# Patient Record
Sex: FEMALE | URBAN - METROPOLITAN AREA
[De-identification: names, ages, dates, MRNs, and addresses within clinical notes are randomized per-mention and may not be internally consistent; named-entity substitution may affect disease eponyms.]

---

## 2023-12-28 ENCOUNTER — OUTPATIENT (OUTPATIENT)
Dept: OUTPATIENT SERVICES | Facility: HOSPITAL | Age: 88
LOS: 1 days | End: 2023-12-28
Payer: MEDICARE

## 2023-12-28 ENCOUNTER — LABORATORY RESULT (OUTPATIENT)
Age: 88
End: 2023-12-28

## 2023-12-28 ENCOUNTER — TRANSCRIPTION ENCOUNTER (OUTPATIENT)
Age: 88
End: 2023-12-28

## 2023-12-28 ENCOUNTER — APPOINTMENT (OUTPATIENT)
Dept: WOUND CARE | Facility: CLINIC | Age: 88
End: 2023-12-28
Payer: MEDICARE

## 2023-12-28 VITALS
RESPIRATION RATE: 16 BRPM | TEMPERATURE: 98.3 F | HEART RATE: 88 BPM | DIASTOLIC BLOOD PRESSURE: 69 MMHG | SYSTOLIC BLOOD PRESSURE: 160 MMHG | OXYGEN SATURATION: 96 %

## 2023-12-28 DIAGNOSIS — Z87.828 PERSONAL HISTORY OF OTHER (HEALED) PHYSICAL INJURY AND TRAUMA: ICD-10-CM

## 2023-12-28 DIAGNOSIS — Z78.9 OTHER SPECIFIED HEALTH STATUS: ICD-10-CM

## 2023-12-28 DIAGNOSIS — L03.119 CELLULITIS OF UNSPECIFIED PART OF LIMB: ICD-10-CM

## 2023-12-28 DIAGNOSIS — L97.922 NON-PRESSURE CHRONIC ULCER OF UNSPECIFIED PART OF LEFT LOWER LEG WITH FAT LAYER EXPOSED: ICD-10-CM

## 2023-12-28 PROBLEM — Z00.00 ENCOUNTER FOR PREVENTIVE HEALTH EXAMINATION: Status: ACTIVE | Noted: 2023-12-28

## 2023-12-28 PROCEDURE — 93922 UPR/L XTREMITY ART 2 LEVELS: CPT | Mod: 26

## 2023-12-28 PROCEDURE — 11042 DBRDMT SUBQ TIS 1ST 20SQCM/<: CPT

## 2023-12-28 PROCEDURE — G0463: CPT | Mod: 25

## 2023-12-28 PROCEDURE — 99204 OFFICE O/P NEW MOD 45 MIN: CPT | Mod: 25

## 2023-12-28 RX ORDER — QUETIAPINE 50 MG/1
50 TABLET, FILM COATED ORAL
Refills: 0 | Status: ACTIVE | COMMUNITY

## 2023-12-28 RX ORDER — MIRTAZAPINE 15 MG/1
15 TABLET, FILM COATED ORAL
Refills: 0 | Status: ACTIVE | COMMUNITY

## 2023-12-28 RX ORDER — GABAPENTIN 100 MG/1
100 CAPSULE ORAL
Refills: 0 | Status: ACTIVE | COMMUNITY

## 2023-12-30 PROBLEM — Z87.828 HISTORY OF TRAUMA: Status: RESOLVED | Noted: 2023-12-30 | Resolved: 2023-12-30

## 2023-12-30 PROBLEM — Z78.9 CURRENT NON-SMOKER: Status: ACTIVE | Noted: 2023-12-30

## 2023-12-30 PROBLEM — Z78.9 ALCOHOL USE: Status: ACTIVE | Noted: 2023-12-30

## 2023-12-30 PROBLEM — L03.119 CELLULITIS OF LOWER EXTREMITY, UNSPECIFIED LATERALITY: Status: ACTIVE | Noted: 2023-12-30

## 2023-12-30 NOTE — HISTORY OF PRESENT ILLNESS
[FreeTextEntry1] : location right leg wounds severity with no pain, no fever timing/duration 3 weeks+ trauma? quality  not on ac other here with aide, Italian, speaks english, DAUGHTER IS PROXY

## 2023-12-30 NOTE — ASSESSMENT
[FreeTextEntry1] : 89 yr old right leg hematoma and s/p right leg cellulitis, signs of PAD on contra leg, will need venous and arterial workup/  culture taken from proximal wound right leg  Patient underwent evaluation for peripheral arterial disease using a [employer] MD diagnostic machine.  Ankle brachial index was obtained using blood pressure cuffs of the ankle and brachial segments of both legs.  A distal pulse volume recording was noted digitally on the device.  The procedure was supervised and interpreted by the physician.  MYRON of the right lower extremity /   MYRON of the left lower extremity ----.  Waveform noted to be ------- (monophasic left, biphasic right).   Patient tolerated procedure well in the office.  See full report Results discussed with the patient.    datacomplexity moderate lab, xr, old rec, test resultsreview,, visualize imagecptreview previous  risk-high frailty surgery  suppplies ordered medihoney patches/ kerlex loose ace no labs today nursing/woundcare orders  : done will reeval, ask daughter if she wants vna consult

## 2023-12-30 NOTE — PHYSICAL EXAM
[JVD] : no jugular venous distention  [Normal Breath Sounds] : Normal breath sounds [Normal Rate and Rhythm] : normal rate and rhythm [Femoral Arteries] : Normal femoral pulses [2+] : left 2+ [1+] : right 1+ [0] : left 0 [Ankle Swelling (On Exam)] : present [Abdomen Tenderness] : ~T ~M No abdominal tenderness [Skin Ulcer] : ulcer [Alert] : alert [Oriented to Person] : oriented to person [Oriented to Place] : oriented to place [Oriented to Time] : oriented to time [Calm] : calm [de-identified] : nad [de-identified] : speaks clearly eomi [de-identified] : able to walk with aide and walker [Please See PDF for [employer]] : Please See PDF for [employer].

## 2024-01-03 DIAGNOSIS — F03.90 UNSPECIFIED DEMENTIA WITHOUT BEHAVIORAL DISTURBANCE: ICD-10-CM

## 2024-01-03 DIAGNOSIS — Z87.828 PERSONAL HISTORY OF OTHER (HEALED) PHYSICAL INJURY AND TRAUMA: ICD-10-CM

## 2024-01-03 DIAGNOSIS — S81.801A UNSPECIFIED OPEN WOUND, RIGHT LOWER LEG, INITIAL ENCOUNTER: ICD-10-CM

## 2024-01-03 DIAGNOSIS — Z78.9 OTHER SPECIFIED HEALTH STATUS: ICD-10-CM

## 2024-01-03 DIAGNOSIS — L03.119 CELLULITIS OF UNSPECIFIED PART OF LIMB: ICD-10-CM

## 2024-01-03 DIAGNOSIS — S80.11XA CONTUSION OF RIGHT LOWER LEG, INITIAL ENCOUNTER: ICD-10-CM

## 2024-01-05 ENCOUNTER — APPOINTMENT (OUTPATIENT)
Dept: WOUND CARE | Facility: CLINIC | Age: 89
End: 2024-01-05
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
## 2024-01-25 ENCOUNTER — APPOINTMENT (OUTPATIENT)
Dept: WOUND CARE | Facility: HOSPITAL | Age: 89
End: 2024-01-25

## 2024-02-09 ENCOUNTER — APPOINTMENT (OUTPATIENT)
Dept: WOUND CARE | Facility: HOSPITAL | Age: 89
End: 2024-02-09
Payer: MEDICARE

## 2024-02-09 VITALS
WEIGHT: 127 LBS | TEMPERATURE: 99.7 F | SYSTOLIC BLOOD PRESSURE: 151 MMHG | HEART RATE: 72 BPM | OXYGEN SATURATION: 96 % | DIASTOLIC BLOOD PRESSURE: 74 MMHG

## 2024-02-09 VITALS — TEMPERATURE: 100.4 F

## 2024-02-09 DIAGNOSIS — I73.9 PERIPHERAL VASCULAR DISEASE, UNSPECIFIED: ICD-10-CM

## 2024-02-09 DIAGNOSIS — F03.90 UNSPECIFIED DEMENTIA W/OUT BEHAVIORAL DISTURBANCE: ICD-10-CM

## 2024-02-09 DIAGNOSIS — L97.929 NON-PRESSURE CHRONIC ULCER OF UNSPECIFIED PART OF LEFT LOWER LEG WITH UNSPECIFIED SEVERITY: ICD-10-CM

## 2024-02-09 DIAGNOSIS — S81.801A UNSPECIFIED OPEN WOUND, RIGHT LOWER LEG, INITIAL ENCOUNTER: ICD-10-CM

## 2024-02-09 DIAGNOSIS — L97.921 NON-PRESSURE CHRONIC ULCER OF UNSPECIFIED PART OF LEFT LOWER LEG LIMITED TO BREAKDOWN OF SKIN: ICD-10-CM

## 2024-02-09 DIAGNOSIS — S80.11XA CONTUSION OF RIGHT LOWER LEG, INITIAL ENCOUNTER: ICD-10-CM

## 2024-02-09 PROCEDURE — 99214 OFFICE O/P EST MOD 30 MIN: CPT

## 2024-02-20 PROBLEM — S81.801A OPEN WOUND OF RIGHT LOWER LEG: Status: ACTIVE | Noted: 2023-12-28

## 2024-02-20 PROBLEM — S80.11XA HEMATOMA OF LEG, RIGHT, INITIAL ENCOUNTER: Status: ACTIVE | Noted: 2023-12-30

## 2024-02-20 PROBLEM — I73.9 PAD (PERIPHERAL ARTERY DISEASE): Status: ACTIVE | Noted: 2024-02-20

## 2024-02-20 NOTE — PHYSICAL EXAM
[Normal Breath Sounds] : Normal breath sounds [Normal Rate and Rhythm] : normal rate and rhythm [Femoral Arteries] : Normal femoral pulses [2+] : left 2+ [1+] : right 1+ [0] : left 0 [Ankle Swelling (On Exam)] : present [Skin Ulcer] : ulcer [Alert] : alert [Oriented to Person] : oriented to person [Oriented to Place] : oriented to place [Oriented to Time] : oriented to time [Calm] : calm [Please See PDF for [employer]] : Please See PDF for [employer]. [JVD] : no jugular venous distention  [Abdomen Tenderness] : ~T ~M No abdominal tenderness [de-identified] : nad [de-identified] : speaks clearly eomi [de-identified] : able to walk with aide and walker

## 2024-02-20 NOTE — ASSESSMENT
[FreeTextEntry1] : 89 yr old right leg hematoma and s/p right leg cellulitis, signs of PAD on contra leg,  new  left leg wound  . new trauma on antibiotics from PCP cephalexin will order medihoney , non stick adaptac right leg wound closed  will need venous and arterial workup/   next visit staph epidermis- culture taken from proximal wound right leg  Patient underwent evaluation for peripheral arterial disease using a [employer] diagnostic machine.  Ankle brachial index was obtained using blood pressure cuffs of the ankle and brachial segments of both legs.  A distal pulse volume recording was noted digitally on the device.  The procedure was supervised and interpreted by the physician.  MYRON of the right lower extremity /   MYRON of the left lower extremity ----.  Waveform noted to be ------- (monophasic left, biphasic right).   Patient tolerated procedure well in the office.  See full report Results discussed with the patient. pad left , right  myron 1.36  consider full vascular testing    datacomplexity moderate lab, xr, old rec, test resultsreview,, visualize imagecptreview previous  risk-high frailty surgery  suppplies ordered medihoney patches/ kerlex loose ace no labs today nursing/woundcare orders  : done will reeval, ask daughter if she wants vna consult

## 2024-02-20 NOTE — HISTORY OF PRESENT ILLNESS
[FreeTextEntry1] : location right leg wounds- closed  new left leg wounds  stairs , outside   severity with no pain, no fever timing/duration 3 weeks+ trauma? quality  not on ac other here with aide, dirk, speaks english, DAUGHTER IS PROXY

## 2024-02-23 PROBLEM — L97.922 ULCER OF LEFT LOWER EXTREMITY WITH FAT LAYER EXPOSED: Status: ACTIVE | Noted: 2024-02-09

## 2024-02-29 ENCOUNTER — APPOINTMENT (OUTPATIENT)
Dept: WOUND CARE | Facility: HOSPITAL | Age: 89
End: 2024-02-29

## 2024-03-14 ENCOUNTER — APPOINTMENT (OUTPATIENT)
Dept: WOUND CARE | Facility: HOSPITAL | Age: 89
End: 2024-03-14